# Patient Record
Sex: MALE | Race: WHITE | NOT HISPANIC OR LATINO | Employment: FULL TIME | URBAN - NONMETROPOLITAN AREA
[De-identification: names, ages, dates, MRNs, and addresses within clinical notes are randomized per-mention and may not be internally consistent; named-entity substitution may affect disease eponyms.]

---

## 2017-02-18 ENCOUNTER — HOSPITAL ENCOUNTER (EMERGENCY)
Facility: HOSPITAL | Age: 37
Discharge: HOME | End: 2017-02-18
Attending: EMERGENCY MEDICINE | Admitting: EMERGENCY MEDICINE

## 2017-02-18 ENCOUNTER — APPOINTMENT (OUTPATIENT)
Dept: CT IMAGING | Facility: HOSPITAL | Age: 37
End: 2017-02-18

## 2017-02-18 VITALS
HEART RATE: 110 BPM | OXYGEN SATURATION: 94 % | WEIGHT: 214 LBS | SYSTOLIC BLOOD PRESSURE: 139 MMHG | BODY MASS INDEX: 31.7 KG/M2 | TEMPERATURE: 98.9 F | RESPIRATION RATE: 18 BRPM | DIASTOLIC BLOOD PRESSURE: 98 MMHG | HEIGHT: 69 IN

## 2017-02-18 DIAGNOSIS — S01.91XA LACERATION OF HEAD WITHOUT FOREIGN BODY, UNSPECIFIED PART OF HEAD, INITIAL ENCOUNTER: Primary | ICD-10-CM

## 2017-02-18 PROCEDURE — 99282 EMERGENCY DEPT VISIT SF MDM: CPT

## 2017-02-18 RX ORDER — LIDOCAINE HYDROCHLORIDE AND EPINEPHRINE 10; 10 MG/ML; UG/ML
10 INJECTION, SOLUTION INFILTRATION; PERINEURAL ONCE
Status: DISCONTINUED | OUTPATIENT
Start: 2017-02-18 | End: 2017-02-18 | Stop reason: HOSPADM

## 2017-02-18 RX ORDER — BACITRACIN ZINC 500 [USP'U]/G
1 OINTMENT TOPICAL ONCE
Status: DISCONTINUED | OUTPATIENT
Start: 2017-02-18 | End: 2017-02-18 | Stop reason: HOSPADM

## 2017-02-18 RX ORDER — CEPHALEXIN 500 MG/1
1000 CAPSULE ORAL 2 TIMES DAILY
Qty: 20 CAPSULE | Refills: 0 | OUTPATIENT
Start: 2017-02-18 | End: 2020-03-27

## 2017-02-18 RX ORDER — IBUPROFEN 600 MG/1
600 TABLET ORAL EVERY 8 HOURS PRN
Qty: 21 TABLET | Refills: 0 | OUTPATIENT
Start: 2017-02-18 | End: 2020-03-27

## 2017-02-18 NOTE — ED PROVIDER NOTES
Subjective   HPI Comments: Patient running after someone on his outdoor fire escape.  Patient struck his head on a metal object.  No LOC.  Laceration and bleeding to the area.  Patient has been drinking.  Not feeling any pain at this time.      Patient is a 36 y.o. male presenting with head injury.   Head Injury   Location:  Frontal  Time since incident:  3 hours  Mechanism of injury: self-inflicted    Mechanism of injury comment:  Hit a balcony  Pain details:     Quality:  Aching    Severity:  Mild    Duration:  3 hours    Timing:  Constant    Progression:  Unchanged  Chronicity:  New  Relieved by:  Nothing  Worsened by:  Nothing  Ineffective treatments:  None tried  Associated symptoms: no blurred vision, no difficulty breathing, no disorientation, no double vision, no focal weakness, no headaches, no hearing loss, no loss of consciousness, no memory loss, no nausea, no neck pain, no numbness, no seizures, no tinnitus and no vomiting    Risk factors: alcohol intake    Risk factors: no obesity and no occupational exposure        Review of Systems   Constitutional: Negative.  Negative for appetite change, chills and fever.   HENT: Negative.  Negative for congestion, hearing loss and tinnitus.    Eyes: Negative.  Negative for blurred vision, double vision, photophobia and visual disturbance.   Respiratory: Negative.  Negative for cough, chest tightness and shortness of breath.    Cardiovascular: Negative.  Negative for chest pain and palpitations.   Gastrointestinal: Negative.  Negative for abdominal pain, constipation, diarrhea, nausea and vomiting.   Endocrine: Negative.    Genitourinary: Negative.  Negative for decreased urine volume, dysuria, flank pain and hematuria.   Musculoskeletal: Negative.  Negative for arthralgias, back pain, myalgias, neck pain and neck stiffness.   Skin: Negative.  Negative for pallor.   Neurological: Negative.  Negative for dizziness, focal weakness, seizures, loss of consciousness,  syncope, weakness, light-headedness, numbness and headaches.   Psychiatric/Behavioral: Negative.  Negative for confusion, memory loss and suicidal ideas. The patient is not nervous/anxious.        No past medical history on file.    No Known Allergies    No past surgical history on file.    No family history on file.    Social History     Social History   • Marital status: Single     Spouse name: N/A   • Number of children: N/A   • Years of education: N/A     Social History Main Topics   • Smoking status: Not on file   • Smokeless tobacco: Not on file   • Alcohol use Not on file   • Drug use: Not on file   • Sexual activity: Not on file     Other Topics Concern   • Not on file     Social History Narrative           Objective   Physical Exam   Constitutional: He is oriented to person, place, and time. He appears well-developed and well-nourished. No distress.   HENT:   Head: Normocephalic.       Eyes: Conjunctivae and EOM are normal.   Neck: Normal range of motion. Neck supple. No JVD present.   Cardiovascular: Normal rate, regular rhythm, normal heart sounds and intact distal pulses.  Exam reveals no gallop and no friction rub.    No murmur heard.  Pulmonary/Chest: Effort normal. No respiratory distress. He has no wheezes. He has no rales. He exhibits no tenderness.   Abdominal: Soft. Bowel sounds are normal. He exhibits no distension and no mass. There is no tenderness. There is no rebound and no guarding.   Musculoskeletal: Normal range of motion.   Neurological: He is alert and oriented to person, place, and time.   Skin: Skin is warm and dry.   Psychiatric: He has a normal mood and affect. His behavior is normal. Judgment and thought content normal.   Nursing note and vitals reviewed.      Laceration Repair  Date/Time: 2/18/2017 4:03 AM  Performed by: HSANE GONZALEZ  Authorized by: SHANE GONZALEZ   Consent: Verbal consent obtained. Written consent not obtained.  Risks and benefits: risks, benefits and  alternatives were discussed  Consent given by: patient  Patient understanding: patient states understanding of the procedure being performed  Body area: head/neck  Location details: forehead  Laceration length: 2 cm  Tendon involvement: none  Nerve involvement: none  Vascular damage: no  Anesthesia: local infiltration    Anesthesia:  Anesthesia: local infiltration  Local Anesthetic: lidocaine 1% with epinephrine   Anesthetic total: 3 mL  Sedation:  Patient sedated: no    Irrigation solution: saline  Irrigation method: syringe  Amount of cleaning: extensive  Debridement: none  Degree of undermining: none  Skin closure: 5-0 nylon  Number of sutures: 8  Technique: simple  Approximation: close  Approximation difficulty: simple  Dressing: antibiotic ointment  Patient tolerance: Patient tolerated the procedure well with no immediate complications               ED Course  ED Course      Labs Reviewed - No data to display    No orders to display       Patient refuses CT scan, though no n/v, no AMS, no dizziness or neurologic symptoms.            MDM    Final diagnoses:   Laceration of head without foreign body, unspecified part of head, initial encounter            Cirilo Robledo MD  02/18/17 [id number]

## 2020-03-27 ENCOUNTER — APPOINTMENT (OUTPATIENT)
Dept: GENERAL RADIOLOGY | Facility: HOSPITAL | Age: 40
End: 2020-03-27

## 2020-03-27 ENCOUNTER — HOSPITAL ENCOUNTER (EMERGENCY)
Facility: HOSPITAL | Age: 40
Discharge: HOME | End: 2020-03-27
Attending: EMERGENCY MEDICINE | Admitting: EMERGENCY MEDICINE

## 2020-03-27 VITALS
OXYGEN SATURATION: 97 % | SYSTOLIC BLOOD PRESSURE: 115 MMHG | WEIGHT: 217 LBS | TEMPERATURE: 98.1 F | RESPIRATION RATE: 16 BRPM | DIASTOLIC BLOOD PRESSURE: 80 MMHG | HEIGHT: 69 IN | HEART RATE: 76 BPM | BODY MASS INDEX: 32.14 KG/M2

## 2020-03-27 DIAGNOSIS — R22.1 THROAT SWELLING: Primary | ICD-10-CM

## 2020-03-27 LAB
ALBUMIN SERPL-MCNC: 4.3 G/DL (ref 3.5–5.2)
ALBUMIN/GLOB SERPL: 1.7 G/DL
ALP SERPL-CCNC: 71 U/L (ref 39–117)
ALT SERPL W P-5'-P-CCNC: 41 U/L (ref 1–41)
ANION GAP SERPL CALCULATED.3IONS-SCNC: 15 MMOL/L (ref 5–15)
AST SERPL-CCNC: 20 U/L (ref 1–40)
BASOPHILS # BLD AUTO: 0.02 10*3/MM3 (ref 0–0.2)
BASOPHILS NFR BLD AUTO: 0.3 % (ref 0–1.5)
BILIRUB SERPL-MCNC: 0.2 MG/DL (ref 0.2–1.2)
BUN BLD-MCNC: 18 MG/DL (ref 6–20)
BUN/CREAT SERPL: 15.9 (ref 7–25)
CALCIUM SPEC-SCNC: 9.8 MG/DL (ref 8.6–10.5)
CHLORIDE SERPL-SCNC: 100 MMOL/L (ref 98–107)
CO2 SERPL-SCNC: 21 MMOL/L (ref 22–29)
CREAT BLD-MCNC: 1.13 MG/DL (ref 0.76–1.27)
DEPRECATED RDW RBC AUTO: 42.5 FL (ref 37–54)
EOSINOPHIL # BLD AUTO: 0.15 10*3/MM3 (ref 0–0.4)
EOSINOPHIL NFR BLD AUTO: 2.1 % (ref 0.3–6.2)
ERYTHROCYTE [DISTWIDTH] IN BLOOD BY AUTOMATED COUNT: 13.2 % (ref 12.3–15.4)
GFR SERPL CREATININE-BSD FRML MDRD: 73 ML/MIN/1.73
GLOBULIN UR ELPH-MCNC: 2.6 GM/DL
GLUCOSE BLD-MCNC: 130 MG/DL (ref 65–99)
HCT VFR BLD AUTO: 39 % (ref 37.5–51)
HGB BLD-MCNC: 13.4 G/DL (ref 13–17.7)
IMM GRANULOCYTES # BLD AUTO: 0.06 10*3/MM3 (ref 0–0.05)
IMM GRANULOCYTES NFR BLD AUTO: 0.8 % (ref 0–0.5)
LYMPHOCYTES # BLD AUTO: 2.22 10*3/MM3 (ref 0.7–3.1)
LYMPHOCYTES NFR BLD AUTO: 30.4 % (ref 19.6–45.3)
MAGNESIUM SERPL-MCNC: 2 MG/DL (ref 1.6–2.6)
MCH RBC QN AUTO: 30.2 PG (ref 26.6–33)
MCHC RBC AUTO-ENTMCNC: 34.4 G/DL (ref 31.5–35.7)
MCV RBC AUTO: 88 FL (ref 79–97)
MONOCYTES # BLD AUTO: 0.61 10*3/MM3 (ref 0.1–0.9)
MONOCYTES NFR BLD AUTO: 8.3 % (ref 5–12)
NEUTROPHILS # BLD AUTO: 4.25 10*3/MM3 (ref 1.7–7)
NEUTROPHILS NFR BLD AUTO: 58.1 % (ref 42.7–76)
NRBC BLD AUTO-RTO: 0 /100 WBC (ref 0–0.2)
NT-PROBNP SERPL-MCNC: 53.5 PG/ML (ref 5–450)
PLATELET # BLD AUTO: 228 10*3/MM3 (ref 140–450)
PMV BLD AUTO: 8.8 FL (ref 6–12)
POTASSIUM BLD-SCNC: 3.8 MMOL/L (ref 3.5–5.2)
PROT SERPL-MCNC: 6.9 G/DL (ref 6–8.5)
RBC # BLD AUTO: 4.43 10*6/MM3 (ref 4.14–5.8)
SODIUM BLD-SCNC: 136 MMOL/L (ref 136–145)
TROPONIN T SERPL-MCNC: <0.01 NG/ML (ref 0–0.03)
WBC NRBC COR # BLD: 7.31 10*3/MM3 (ref 3.4–10.8)

## 2020-03-27 PROCEDURE — 85025 COMPLETE CBC W/AUTO DIFF WBC: CPT | Performed by: EMERGENCY MEDICINE

## 2020-03-27 PROCEDURE — 25010000002 METHYLPREDNISOLONE PER 125 MG: Performed by: EMERGENCY MEDICINE

## 2020-03-27 PROCEDURE — 71045 X-RAY EXAM CHEST 1 VIEW: CPT

## 2020-03-27 PROCEDURE — 80053 COMPREHEN METABOLIC PANEL: CPT | Performed by: EMERGENCY MEDICINE

## 2020-03-27 PROCEDURE — 83880 ASSAY OF NATRIURETIC PEPTIDE: CPT | Performed by: EMERGENCY MEDICINE

## 2020-03-27 PROCEDURE — 96375 TX/PRO/DX INJ NEW DRUG ADDON: CPT

## 2020-03-27 PROCEDURE — 84484 ASSAY OF TROPONIN QUANT: CPT | Performed by: EMERGENCY MEDICINE

## 2020-03-27 PROCEDURE — 93005 ELECTROCARDIOGRAM TRACING: CPT | Performed by: EMERGENCY MEDICINE

## 2020-03-27 PROCEDURE — 93010 ELECTROCARDIOGRAM REPORT: CPT | Performed by: INTERNAL MEDICINE

## 2020-03-27 PROCEDURE — 83735 ASSAY OF MAGNESIUM: CPT | Performed by: EMERGENCY MEDICINE

## 2020-03-27 PROCEDURE — 36415 COLL VENOUS BLD VENIPUNCTURE: CPT

## 2020-03-27 PROCEDURE — 96374 THER/PROPH/DIAG INJ IV PUSH: CPT

## 2020-03-27 PROCEDURE — 99283 EMERGENCY DEPT VISIT LOW MDM: CPT

## 2020-03-27 PROCEDURE — 93005 ELECTROCARDIOGRAM TRACING: CPT

## 2020-03-27 RX ORDER — SODIUM CHLORIDE 0.9 % (FLUSH) 0.9 %
10 SYRINGE (ML) INJECTION AS NEEDED
Status: DISCONTINUED | OUTPATIENT
Start: 2020-03-27 | End: 2020-03-27 | Stop reason: HOSPADM

## 2020-03-27 RX ORDER — LOVASTATIN 40 MG/1
40 TABLET ORAL NIGHTLY
COMMUNITY
End: 2020-12-03 | Stop reason: SDUPTHER

## 2020-03-27 RX ORDER — AMLODIPINE BESYLATE 10 MG/1
10 TABLET ORAL DAILY
Qty: 15 TABLET | Refills: 0 | Status: SHIPPED | OUTPATIENT
Start: 2020-03-27 | End: 2020-04-11

## 2020-03-27 RX ORDER — LISINOPRIL AND HYDROCHLOROTHIAZIDE 20; 12.5 MG/1; MG/1
1 TABLET ORAL DAILY
COMMUNITY
End: 2020-03-27

## 2020-03-27 RX ORDER — FAMOTIDINE 20 MG/1
20 TABLET, FILM COATED ORAL NIGHTLY
Qty: 4 TABLET | Refills: 0 | Status: SHIPPED | OUTPATIENT
Start: 2020-03-27 | End: 2020-03-31

## 2020-03-27 RX ORDER — PREDNISONE 20 MG/1
20 TABLET ORAL DAILY
Qty: 4 TABLET | Refills: 0 | Status: SHIPPED | OUTPATIENT
Start: 2020-03-27 | End: 2020-03-31

## 2020-03-27 RX ORDER — FAMOTIDINE 10 MG/ML
20 INJECTION, SOLUTION INTRAVENOUS ONCE
Status: COMPLETED | OUTPATIENT
Start: 2020-03-27 | End: 2020-03-27

## 2020-03-27 RX ORDER — METHYLPREDNISOLONE SODIUM SUCCINATE 125 MG/2ML
125 INJECTION, POWDER, LYOPHILIZED, FOR SOLUTION INTRAMUSCULAR; INTRAVENOUS ONCE
Status: COMPLETED | OUTPATIENT
Start: 2020-03-27 | End: 2020-03-27

## 2020-03-27 RX ADMIN — FAMOTIDINE 20 MG: 10 INJECTION INTRAVENOUS at 03:22

## 2020-03-27 RX ADMIN — METHYLPREDNISOLONE SODIUM SUCCINATE 125 MG: 125 INJECTION, POWDER, FOR SOLUTION INTRAMUSCULAR; INTRAVENOUS at 03:22

## 2020-12-03 ENCOUNTER — OFFICE VISIT (OUTPATIENT)
Dept: OTOLARYNGOLOGY | Facility: CLINIC | Age: 40
End: 2020-12-03

## 2020-12-03 VITALS — HEIGHT: 69 IN | OXYGEN SATURATION: 98 % | WEIGHT: 224.4 LBS | BODY MASS INDEX: 33.24 KG/M2

## 2020-12-03 DIAGNOSIS — H93.13 TINNITUS OF BOTH EARS: ICD-10-CM

## 2020-12-03 DIAGNOSIS — J32.3 CHRONIC SPHENOIDAL SINUSITIS: Primary | ICD-10-CM

## 2020-12-03 DIAGNOSIS — H90.3 SENSORINEURAL HEARING LOSS OF BOTH EARS: ICD-10-CM

## 2020-12-03 DIAGNOSIS — J34.2 NASAL SEPTAL DEFORMITY: ICD-10-CM

## 2020-12-03 PROCEDURE — 31231 NASAL ENDOSCOPY DX: CPT | Performed by: OTOLARYNGOLOGY

## 2020-12-03 PROCEDURE — 99203 OFFICE O/P NEW LOW 30 MIN: CPT | Performed by: OTOLARYNGOLOGY

## 2020-12-03 RX ORDER — HYDROCHLOROTHIAZIDE 12.5 MG/1
12.5 TABLET ORAL 2 TIMES DAILY
COMMUNITY
Start: 2020-12-01

## 2020-12-03 RX ORDER — FLUTICASONE PROPIONATE 50 MCG
2 SPRAY, SUSPENSION (ML) NASAL DAILY
Qty: 16 G | Refills: 11 | Status: SHIPPED | OUTPATIENT
Start: 2020-12-03 | End: 2021-03-15 | Stop reason: HOSPADM

## 2020-12-03 RX ORDER — LOSARTAN POTASSIUM 25 MG/1
25 TABLET ORAL DAILY
COMMUNITY
Start: 2020-12-01

## 2020-12-03 RX ORDER — PREDNISONE 20 MG/1
TABLET ORAL
Qty: 12 TABLET | Refills: 0 | Status: SHIPPED | OUTPATIENT
Start: 2020-12-03 | End: 2021-01-05

## 2020-12-03 RX ORDER — LOVASTATIN 40 MG/1
40 TABLET ORAL NIGHTLY
COMMUNITY
Start: 2020-12-01

## 2020-12-03 RX ORDER — CEFUROXIME AXETIL 500 MG/1
500 TABLET ORAL 2 TIMES DAILY
Qty: 42 TABLET | Refills: 0 | Status: SHIPPED | OUTPATIENT
Start: 2020-12-03 | End: 2021-01-05

## 2020-12-06 NOTE — PROGRESS NOTES
Subjective   Colby Don is a 40 y.o. male.     History of Present Illness     Patient reports he had a sinus infection back in September.  States that symptoms were mostly on the left side.  Experience facial pressure congestion and some mild rhinorrhea.  Was treated with antibiotics and steroids and had some improvement but symptoms then recurred in about a week and a half.  Was treated with additional antibiotics and again improved but states that symptoms recur about a week and 1/2 to 2 weeks after going off antibiotics and reports that just yesterday he has begun having left-sided nasal congestion and pressure as well as mild rhinorrhea and postnasal drainage.  Is not currently using any medication for this.  Additionally reports ringing in his ears that is been present for some time.  His hearing is chronically decreased on the left greater than the right and has been for years following head injury many years ago.  No previous otologic surgery.  He brings with him a copy of an audiogram obtained at [facility] dated 10/7/2020 is personally reviewed and shows a bilateral high-frequency sensorineural hearing loss that is worse on the left than the right.  Discrimination scores were 100% bilaterally.  Tympanograms were type a bilaterally.    The following portions of the patient's history were reviewed and updated as appropriate: allergies, current medications, past family history, past medical history, past social history, past surgical history and problem list.      Colby Don reports that he has quit smoking. He has never used smokeless tobacco.  Patient is not a tobacco user and has not been counseled for use of tobacco products    No family history on file.   Negative for hearing loss at an early age    No Known Allergies      Current Outpatient Medications:   •  ERGOCALCIFEROL PO, Take 50,000 Units by mouth., Disp: , Rfl:   •  lovastatin (MEVACOR) 40 MG tablet, TAKE ONE  TABLET BY MOUTH EVERY EVENING, Disp: , Rfl:   •  cefuroxime (CEFTIN) 500 MG tablet, Take 1 tablet by mouth 2 (Two) Times a Day., Disp: 42 tablet, Rfl: 0  •  fluticasone (FLONASE) 50 MCG/ACT nasal spray, 2 sprays into the nostril(s) as directed by provider Daily., Disp: 16 g, Rfl: 11  •  hydroCHLOROthiazide (HYDRODIURIL) 12.5 MG tablet, , Disp: , Rfl:   •  losartan (COZAAR) 25 MG tablet, , Disp: , Rfl:   •  predniSONE (DELTASONE) 20 MG tablet, 1 pill by mouth 3 times a day for 2 days then 1 pill by mouth twice a day for 2 days then 1 pill by mouth daily for 2 days, Disp: 12 tablet, Rfl: 0    Past Medical History:   Diagnosis Date   • Hyperlipidemia    • Hypertension        Past Surgical History:   Procedure Laterality Date   • WISDOM TOOTH EXTRACTION           Review of Systems   Constitutional: Negative for fever.   HENT: Positive for congestion and ear pain.    All other systems reviewed and are negative.          Objective   Physical Exam  General: Well-developed well-nourished male in no acute distress.  Alert and oriented x3. Head: Normocephalic. Face: Symmetrical strength and appearance. PERRL. EOMI. Voice:Strong. Speech:Fluent  Ears: External ears no deformity, canals no discharge, tympanic membranes intact clear and mobile bilaterally.  Nose: Nares show no discharge mass polyp or purulence.  Boggy mucosa is present.  No gross external deformity.  Septum: To the right inferiorly  Oral cavity: Lips and gums without lesions.  Tongue and floor of mouth without lesions.  Parotid and submandibular ducts unobstructed.  No mucosal lesions on the buccal mucosa or vestibule of the mouth.  Pharynx: No erythema exudate mass or ulcer  Neck: No lymphadenopathy.  No thyromegaly.  Trachea and larynx midline.  No masses in the parotid or submandibular glands.  Nasal endoscopy is performed: Fish-Synephrine and Xylocaine are instilled the nares bilaterally.  0° scope is passed into each nostril.  The inferior, middle, and  superior turbinates as well as nasal septum and nasopharynx are examined.  Pertinent findings include: Angulated septal deformity to the right inferiorly impacting and causing scalloping of the right inferior turbinate.  No evidence of mass or polyp in either middle meatal cleft but there is purulent material and what appears to be polyps in the left superior meatal cleft and face of the sphenoid.        Assessment/Plan   Diagnoses and all orders for this visit:    1. Chronic sphenoidal sinusitis (Primary)    2. Nasal septal deformity    3. Tinnitus of both ears    4. Sensorineural hearing loss of both ears    Other orders  -     fluticasone (FLONASE) 50 MCG/ACT nasal spray; 2 sprays into the nostril(s) as directed by provider Daily.  Dispense: 16 g; Refill: 11  -     cefuroxime (CEFTIN) 500 MG tablet; Take 1 tablet by mouth 2 (Two) Times a Day.  Dispense: 42 tablet; Refill: 0  -     predniSONE (DELTASONE) 20 MG tablet; 1 pill by mouth 3 times a day for 2 days then 1 pill by mouth twice a day for 2 days then 1 pill by mouth daily for 2 days  Dispense: 12 tablet; Refill: 0        Plan: Aggressive medical therapy with 3 weeks of Ceftin 500 mg p.o. twice daily, 9-day prednisone taper, and institute Flonase 2 sprays each nostril once a day.  Reevaluate in 4 weeks.  If no better will obtain a CT scan at that time.    Also explained the nature of tinnitus to the patient and its relationship to hearing loss.  Advise use of masking noise as well as ear protection when unavoidably around loud noise going forward.  He should have another hearing test in a year.

## 2021-01-05 ENCOUNTER — OFFICE VISIT (OUTPATIENT)
Dept: OTOLARYNGOLOGY | Facility: CLINIC | Age: 41
End: 2021-01-05

## 2021-01-05 VITALS — HEIGHT: 69 IN | WEIGHT: 224 LBS | BODY MASS INDEX: 33.18 KG/M2 | OXYGEN SATURATION: 96 %

## 2021-01-05 DIAGNOSIS — J32.3 CHRONIC SPHENOIDAL SINUSITIS: Primary | ICD-10-CM

## 2021-01-05 DIAGNOSIS — J34.2 NASAL SEPTAL DEFORMITY: ICD-10-CM

## 2021-01-05 PROCEDURE — 99213 OFFICE O/P EST LOW 20 MIN: CPT | Performed by: OTOLARYNGOLOGY

## 2021-01-05 NOTE — PROGRESS NOTES
"Subjective   Colby Don is a 40 y.o. male.       History of Present Illness   Patient was seen previously with complaints of left-sided nasal congestion and intermittent rhinorrhea.  Was noted to have endoscopic findings consistent with left-sided sphenoid sinusitis.  Has completed a 3-week course of cefuroxime as well as a 6-day prednisone taper.  He continues to use Flonase.  He reports he is \"a lot better\".  Not having any significant facial pressure nor any rhinorrhea.  Still feels some occasional fullness in his ear.      The following portions of the patient's history were reviewed and updated as appropriate: allergies, current medications, past family history, past medical history, past social history, past surgical history and problem list.     reports that he has quit smoking. He has never used smokeless tobacco.   Patient is not a tobacco user and has not been counseled for use of tobacco products      Review of Systems        Objective   Physical Exam  Ears: External ears no deformity, canals no discharge, tympanic membranes intact clear and mobile bilaterally.  Nose: Boggy mucosa with no mass or polyp or purulence seen on anterior rhinoscopy.  Septum to the right  Oral cavity: Lips and gums without lesions.  Tongue and floor of mouth without lesions.  Parotid and submandibular ducts unobstructed.  No mucosal lesions on the buccal mucosa or vestibule of the mouth.  Pharynx: No erythema or exudate      Assessment/Plan   Diagnoses and all orders for this visit:    1. Chronic sphenoidal sinusitis (Primary)    2. Nasal septal deformity      Plan: Since he is clinically improved I have just recommended continuing his Flonase daily and return in 6 months but call for recurrent symptoms prior to that.      "

## 2021-02-08 ENCOUNTER — OFFICE VISIT (OUTPATIENT)
Dept: OTOLARYNGOLOGY | Facility: CLINIC | Age: 41
End: 2021-02-08

## 2021-02-08 VITALS — HEIGHT: 69 IN | WEIGHT: 224.4 LBS | TEMPERATURE: 98.5 F | BODY MASS INDEX: 33.24 KG/M2 | OXYGEN SATURATION: 97 %

## 2021-02-08 DIAGNOSIS — J32.3 CHRONIC SPHENOIDAL SINUSITIS: Primary | ICD-10-CM

## 2021-02-08 PROCEDURE — 99214 OFFICE O/P EST MOD 30 MIN: CPT | Performed by: OTOLARYNGOLOGY

## 2021-02-08 RX ORDER — AMOXICILLIN AND CLAVULANATE POTASSIUM 875; 125 MG/1; MG/1
1 TABLET, FILM COATED ORAL EVERY 12 HOURS
Qty: 42 TABLET | Refills: 0 | Status: SHIPPED | OUTPATIENT
Start: 2021-02-08 | End: 2021-03-10

## 2021-02-08 NOTE — PROGRESS NOTES
Subjective   Colby Don is a 40 y.o. male.       History of Present Illness   Patient was seen previously with left-sided nasal congestion, rhinorrhea, and ear fullness and headache.  Nasal endoscopy had revealed what appeared to be sphenoid sinusitis.  Was given a 3-week course of cefuroxime and improved significantly.  Reports that within a week of having seen me on 1/5/2021 he started having recurrent symptoms and is now back to about as bad as he was before he saw me.  Left nose is congested he is having postnasal drainage and rhinorrhea as well as ear fullness and headache      The following portions of the patient's history were reviewed and updated as appropriate: allergies, current medications, past family history, past medical history, past social history, past surgical history and problem list.     reports that he has quit smoking. He has never used smokeless tobacco.   Patient is not a tobacco user and has not been counseled for use of tobacco products      Review of Systems        Objective   Physical Exam  Ears: External ears no deformity, canals no discharge, tympanic membranes intact clear and mobile bilaterally.  Nose: Septum to the right.  Boggy mucosa.  No purulence noted anteriorly        Assessment/Plan   Diagnoses and all orders for this visit:    1. Chronic sphenoidal sinusitis (Primary)  -     CT Maxillofacial Without Contrast; Future    Other orders  -     amoxicillin-clavulanate (AUGMENTIN) 875-125 MG per tablet; Take 1 tablet by mouth Every 12 (Twelve) Hours.  Dispense: 42 tablet; Refill: 0      Plan: Clinically this is a rapid recurrence of his previous sphenoid sinusitis.  I do not think he needs nasal endoscopy to confirm this.  We will treat with Augmentin 875 mg twice daily and schedule CT scan of the sinuses.  Patient will be seen back after his scan.

## 2021-02-23 ENCOUNTER — HOSPITAL ENCOUNTER (OUTPATIENT)
Dept: CT IMAGING | Facility: HOSPITAL | Age: 41
Discharge: HOME | End: 2021-02-23
Admitting: OTOLARYNGOLOGY

## 2021-02-23 ENCOUNTER — OFFICE VISIT (OUTPATIENT)
Dept: OTOLARYNGOLOGY | Facility: CLINIC | Age: 41
End: 2021-02-23

## 2021-02-23 VITALS — WEIGHT: 219.2 LBS | OXYGEN SATURATION: 98 % | BODY MASS INDEX: 32.47 KG/M2 | HEIGHT: 69 IN

## 2021-02-23 DIAGNOSIS — J34.2 NASAL SEPTAL DEFORMITY: ICD-10-CM

## 2021-02-23 DIAGNOSIS — J32.3 CHRONIC SPHENOIDAL SINUSITIS: ICD-10-CM

## 2021-02-23 DIAGNOSIS — J32.4 CHRONIC PANSINUSITIS: Primary | ICD-10-CM

## 2021-02-23 PROCEDURE — 99214 OFFICE O/P EST MOD 30 MIN: CPT | Performed by: OTOLARYNGOLOGY

## 2021-02-23 PROCEDURE — 70486 CT MAXILLOFACIAL W/O DYE: CPT

## 2021-02-23 RX ORDER — BUSPIRONE HYDROCHLORIDE 5 MG/1
5 TABLET ORAL
COMMUNITY
Start: 2021-01-12 | End: 2021-03-10

## 2021-02-23 RX ORDER — OXYMETAZOLINE HYDROCHLORIDE 0.05 G/100ML
2 SPRAY NASAL
Status: CANCELLED | OUTPATIENT
Start: 2021-03-15 | End: 2021-03-18

## 2021-02-23 NOTE — PROGRESS NOTES
Subjective   Colby Don is a 40 y.o. male.     History of Present Illness     Patient has been followed with a longstanding history of recurring sinus infections purulent rhinorrhea and nasal congestion headaches and aural fullness.  Clinically had evidence of sinusitis.  Initially had good response to medical therapy but then had recurrence of symptoms.  Has been taking Augmentin and reports that he has still had congestion, rhinorrhea, and postnasal drainage as well as mid facial pressure and headache.  Had a CT scan of the sinuses this morning that is personally reviewed.  This shows complete opacification of the left sphenoid and left posterior ethmoid along with chronic disease of the anterior ethmoids, maxillary sinuses, and frontal sinuses evidenced by mucosal thickening.  He also has a marked nasal septal deformity.    The following portions of the patient's history were reviewed and updated as appropriate: allergies, current medications, past family history, past medical history, past social history, past surgical history and problem list.      Colby Don reports that he has quit smoking. He has never used smokeless tobacco.  Patient is not a tobacco user and has not been counseled for use of tobacco products    No family history on file.   Negative for bleeding disorder    No Known Allergies      Current Outpatient Medications:   •  amoxicillin-clavulanate (AUGMENTIN) 875-125 MG per tablet, Take 1 tablet by mouth Every 12 (Twelve) Hours., Disp: 42 tablet, Rfl: 0  •  busPIRone (BUSPAR) 5 MG tablet, Take 5 mg by mouth., Disp: , Rfl:   •  ERGOCALCIFEROL PO, Take 50,000 Units by mouth., Disp: , Rfl:   •  fluticasone (FLONASE) 50 MCG/ACT nasal spray, 2 sprays into the nostril(s) as directed by provider Daily., Disp: 16 g, Rfl: 11  •  hydroCHLOROthiazide (HYDRODIURIL) 12.5 MG tablet, , Disp: , Rfl:   •  losartan (COZAAR) 25 MG tablet, , Disp: , Rfl:   •  lovastatin (MEVACOR) 40 MG tablet,  TAKE ONE TABLET BY MOUTH EVERY EVENING, Disp: , Rfl:     Past Medical History:   Diagnosis Date   • Hyperlipidemia    • Hypertension        Past Surgical History:   Procedure Laterality Date   • WISDOM TOOTH EXTRACTION           Review of Systems   Constitutional: Negative for fever.   HENT: Positive for congestion.            Objective   Physical Exam  General: Well-developed well-nourished male in no acute distress.  Alert and oriented x3. Voice:Strong. Speech:Fluent  Ears: External ears no deformity, canals no discharge, tympanic membranes intact clear and mobile bilaterally.  Nose: Nares show boggy mucosa.  No mass or purulence visible on anterior rhinoscopy.  Septal deformity to the right  Oral cavity: Lips and gums without lesions.  Tongue and floor of mouth without lesions.  Parotid and submandibular ducts unobstructed.  No mucosal lesions on the buccal mucosa or vestibule of the mouth.  Pharynx: No erythema exudate mass or ulcer  Neck: No lymphadenopathy.  No thyromegaly.  Trachea and larynx midline.  No masses in the parotid or submandibular glands.  Chest: Clear.  Heart: Regular.  Abdomen: Benign.        Assessment/Plan   Diagnoses and all orders for this visit:    1. Chronic pansinusitis (Primary)  -     Case Request; Standing  -     oxymetazoline (AFRIN) nasal spray 2 spray  -     Case Request    2. Nasal septal deformity  -     Case Request; Standing  -     oxymetazoline (AFRIN) nasal spray 2 spray  -     Case Request    Other orders  -     Follow Anesthesia Guidelines / Standing Orders; Future  -     Obtain Informed Consent; Future  -     Follow Anesthesia Guidelines / Standing Orders; Standing  -     Obtain Informed Consent; Standing  -     SCD (Sequential Compression Device) - To Be Placed on Patient in Pre-Op; Standing  -     NPO Diet; Standing      Plan: Given his persistent symptoms despite multiple rounds of appropriate antibiotics as well as significantly abnormal radiographic studies I think  surgery is a reasonable option.  I have offered to perform bilateral endoscopic complete ethmoidectomy, maxillary sinus antrostomy, frontal recess exploration, sphenoidotomy along with nasal septoplasty.  I explained the nature of these procedures to the patient in layman's terms including need for general anesthetic and risks including bleeding, infection, poor healing, failure to improve symptoms, potential for damage to the eyes or base of the brain which could result in leakage of spinal fluid and potentially require additional surgery, possibility of hole in the nasal septum or numbness of the front teeth and palate, as well as the need for postop debridements which may be quite painful.  Proposed benefit would be improved nasal airflow, decreased frequency of sinus infections and sinus related symptoms including congestion and rhinorrhea.  The alternative would be observation and continued medical management which was discussed and offered.  Patient voices understanding of all the above and wished to proceed with surgery.  This is scheduled.

## 2021-03-10 RX ORDER — CHLORAL HYDRATE 500 MG
1000 CAPSULE ORAL
COMMUNITY

## 2021-03-12 ENCOUNTER — LAB (OUTPATIENT)
Dept: LAB | Facility: HOSPITAL | Age: 41
End: 2021-03-12

## 2021-03-12 DIAGNOSIS — Z01.818 PREOP TESTING: Primary | ICD-10-CM

## 2021-03-12 PROCEDURE — C9803 HOPD COVID-19 SPEC COLLECT: HCPCS

## 2021-03-12 PROCEDURE — U0004 COV-19 TEST NON-CDC HGH THRU: HCPCS

## 2021-03-13 LAB — SARS-COV-2 ORF1AB RESP QL NAA+PROBE: NOT DETECTED

## 2021-03-14 ENCOUNTER — ANESTHESIA EVENT (OUTPATIENT)
Dept: PERIOP | Facility: HOSPITAL | Age: 41
End: 2021-03-14

## 2021-03-15 ENCOUNTER — HOSPITAL ENCOUNTER (OUTPATIENT)
Facility: HOSPITAL | Age: 41
Setting detail: HOSPITAL OUTPATIENT SURGERY
Discharge: HOME | End: 2021-03-15
Attending: OTOLARYNGOLOGY | Admitting: OTOLARYNGOLOGY

## 2021-03-15 ENCOUNTER — ANESTHESIA (OUTPATIENT)
Dept: PERIOP | Facility: HOSPITAL | Age: 41
End: 2021-03-15

## 2021-03-15 VITALS
RESPIRATION RATE: 20 BRPM | BODY MASS INDEX: 32.1 KG/M2 | SYSTOLIC BLOOD PRESSURE: 156 MMHG | WEIGHT: 216.71 LBS | HEART RATE: 80 BPM | DIASTOLIC BLOOD PRESSURE: 100 MMHG | OXYGEN SATURATION: 96 % | TEMPERATURE: 97 F | HEIGHT: 69 IN

## 2021-03-15 DIAGNOSIS — J32.4 CHRONIC PANSINUSITIS: ICD-10-CM

## 2021-03-15 DIAGNOSIS — J34.2 NASAL SEPTAL DEFORMITY: ICD-10-CM

## 2021-03-15 LAB
ANION GAP SERPL CALCULATED.3IONS-SCNC: 12 MMOL/L (ref 5–15)
BUN SERPL-MCNC: 18 MG/DL (ref 6–20)
BUN/CREAT SERPL: 20.2 (ref 7–25)
CALCIUM SPEC-SCNC: 9.1 MG/DL (ref 8.6–10.5)
CHLORIDE SERPL-SCNC: 101 MMOL/L (ref 98–107)
CO2 SERPL-SCNC: 26 MMOL/L (ref 22–29)
CREAT SERPL-MCNC: 0.89 MG/DL (ref 0.76–1.27)
GFR SERPL CREATININE-BSD FRML MDRD: 95 ML/MIN/1.73
GLUCOSE SERPL-MCNC: 124 MG/DL (ref 65–99)
POTASSIUM SERPL-SCNC: 3.7 MMOL/L (ref 3.5–5.2)
QT INTERVAL: 370 MS
QTC INTERVAL: 457 MS
SODIUM SERPL-SCNC: 139 MMOL/L (ref 136–145)

## 2021-03-15 PROCEDURE — 25010000002 HYDROMORPHONE PER 4 MG: Performed by: NURSE ANESTHETIST, CERTIFIED REGISTERED

## 2021-03-15 PROCEDURE — 25010000002 MIDAZOLAM PER 1 MG: Performed by: NURSE ANESTHETIST, CERTIFIED REGISTERED

## 2021-03-15 PROCEDURE — 80048 BASIC METABOLIC PNL TOTAL CA: CPT | Performed by: ANESTHESIOLOGY

## 2021-03-15 PROCEDURE — 25010000002 ONDANSETRON PER 1 MG: Performed by: NURSE ANESTHETIST, CERTIFIED REGISTERED

## 2021-03-15 PROCEDURE — 25010000002 NEOSTIGMINE 4 MG/4ML SOLUTION PREFILLED SYRINGE: Performed by: NURSE ANESTHETIST, CERTIFIED REGISTERED

## 2021-03-15 PROCEDURE — 31257 NSL/SINS NDSC TOT W/SPHENDT: CPT | Performed by: OTOLARYNGOLOGY

## 2021-03-15 PROCEDURE — 25010000002 PROPOFOL 10 MG/ML EMULSION: Performed by: NURSE ANESTHETIST, CERTIFIED REGISTERED

## 2021-03-15 PROCEDURE — 93010 ELECTROCARDIOGRAM REPORT: CPT | Performed by: INTERNAL MEDICINE

## 2021-03-15 PROCEDURE — 30520 REPAIR OF NASAL SEPTUM: CPT | Performed by: OTOLARYNGOLOGY

## 2021-03-15 PROCEDURE — 25010000002 FENTANYL CITRATE (PF) 100 MCG/2ML SOLUTION: Performed by: NURSE ANESTHETIST, CERTIFIED REGISTERED

## 2021-03-15 PROCEDURE — 31256 EXPLORATION MAXILLARY SINUS: CPT | Performed by: OTOLARYNGOLOGY

## 2021-03-15 PROCEDURE — 31276 NSL/SINS NDSC FRNT TISS RMVL: CPT | Performed by: OTOLARYNGOLOGY

## 2021-03-15 PROCEDURE — 25010000002 HYDROMORPHONE 1 MG/ML SOLUTION: Performed by: NURSE ANESTHETIST, CERTIFIED REGISTERED

## 2021-03-15 PROCEDURE — 25010000002 DEXAMETHASONE PER 1 MG: Performed by: NURSE ANESTHETIST, CERTIFIED REGISTERED

## 2021-03-15 PROCEDURE — 93005 ELECTROCARDIOGRAM TRACING: CPT | Performed by: ANESTHESIOLOGY

## 2021-03-15 RX ORDER — FENTANYL CITRATE 50 UG/ML
INJECTION, SOLUTION INTRAMUSCULAR; INTRAVENOUS AS NEEDED
Status: DISCONTINUED | OUTPATIENT
Start: 2021-03-15 | End: 2021-03-15 | Stop reason: SURG

## 2021-03-15 RX ORDER — LIDOCAINE HYDROCHLORIDE AND EPINEPHRINE 10; 10 MG/ML; UG/ML
INJECTION, SOLUTION INFILTRATION; PERINEURAL AS NEEDED
Status: DISCONTINUED | OUTPATIENT
Start: 2021-03-15 | End: 2021-03-15 | Stop reason: HOSPADM

## 2021-03-15 RX ORDER — MIDAZOLAM HYDROCHLORIDE 1 MG/ML
INJECTION INTRAMUSCULAR; INTRAVENOUS AS NEEDED
Status: DISCONTINUED | OUTPATIENT
Start: 2021-03-15 | End: 2021-03-15 | Stop reason: SURG

## 2021-03-15 RX ORDER — OXYMETAZOLINE HYDROCHLORIDE 0.05 G/100ML
2 SPRAY NASAL
Status: DISCONTINUED | OUTPATIENT
Start: 2021-03-15 | End: 2021-03-15 | Stop reason: HOSPADM

## 2021-03-15 RX ORDER — PROPOFOL 10 MG/ML
VIAL (ML) INTRAVENOUS AS NEEDED
Status: DISCONTINUED | OUTPATIENT
Start: 2021-03-15 | End: 2021-03-15 | Stop reason: SURG

## 2021-03-15 RX ORDER — LIDOCAINE HYDROCHLORIDE AND EPINEPHRINE BITARTRATE 20; .01 MG/ML; MG/ML
INJECTION, SOLUTION SUBCUTANEOUS AS NEEDED
Status: DISCONTINUED | OUTPATIENT
Start: 2021-03-15 | End: 2021-03-15 | Stop reason: HOSPADM

## 2021-03-15 RX ORDER — MEPERIDINE HYDROCHLORIDE 25 MG/ML
12.5 INJECTION INTRAMUSCULAR; INTRAVENOUS; SUBCUTANEOUS
Status: DISCONTINUED | OUTPATIENT
Start: 2021-03-15 | End: 2021-03-15 | Stop reason: HOSPADM

## 2021-03-15 RX ORDER — ROCURONIUM BROMIDE 10 MG/ML
INJECTION, SOLUTION INTRAVENOUS AS NEEDED
Status: DISCONTINUED | OUTPATIENT
Start: 2021-03-15 | End: 2021-03-15 | Stop reason: SURG

## 2021-03-15 RX ORDER — SODIUM CHLORIDE, SODIUM GLUCONATE, SODIUM ACETATE, POTASSIUM CHLORIDE, AND MAGNESIUM CHLORIDE 526; 502; 368; 37; 30 MG/100ML; MG/100ML; MG/100ML; MG/100ML; MG/100ML
1000 INJECTION, SOLUTION INTRAVENOUS CONTINUOUS
Status: DISCONTINUED | OUTPATIENT
Start: 2021-03-15 | End: 2021-03-15 | Stop reason: HOSPADM

## 2021-03-15 RX ORDER — NEOSTIGMINE METHYLSULFATE 4 MG/4 ML
SYRINGE (ML) INTRAVENOUS AS NEEDED
Status: DISCONTINUED | OUTPATIENT
Start: 2021-03-15 | End: 2021-03-15 | Stop reason: SURG

## 2021-03-15 RX ORDER — ONDANSETRON 2 MG/ML
INJECTION INTRAMUSCULAR; INTRAVENOUS AS NEEDED
Status: DISCONTINUED | OUTPATIENT
Start: 2021-03-15 | End: 2021-03-15 | Stop reason: SURG

## 2021-03-15 RX ORDER — ALBUTEROL SULFATE 2.5 MG/3ML
2.5 SOLUTION RESPIRATORY (INHALATION) ONCE AS NEEDED
Status: DISCONTINUED | OUTPATIENT
Start: 2021-03-15 | End: 2021-03-15 | Stop reason: HOSPADM

## 2021-03-15 RX ORDER — HYDROMORPHONE HCL 110MG/55ML
PATIENT CONTROLLED ANALGESIA SYRINGE INTRAVENOUS AS NEEDED
Status: DISCONTINUED | OUTPATIENT
Start: 2021-03-15 | End: 2021-03-15 | Stop reason: SURG

## 2021-03-15 RX ORDER — ONDANSETRON 2 MG/ML
4 INJECTION INTRAMUSCULAR; INTRAVENOUS ONCE AS NEEDED
Status: DISCONTINUED | OUTPATIENT
Start: 2021-03-15 | End: 2021-03-15 | Stop reason: HOSPADM

## 2021-03-15 RX ORDER — LIDOCAINE HYDROCHLORIDE 20 MG/ML
INJECTION, SOLUTION INFILTRATION; PERINEURAL AS NEEDED
Status: DISCONTINUED | OUTPATIENT
Start: 2021-03-15 | End: 2021-03-15 | Stop reason: SURG

## 2021-03-15 RX ORDER — OXYCODONE HYDROCHLORIDE AND ACETAMINOPHEN 5; 325 MG/1; MG/1
1 TABLET ORAL ONCE AS NEEDED
Status: DISCONTINUED | OUTPATIENT
Start: 2021-03-15 | End: 2021-03-15 | Stop reason: HOSPADM

## 2021-03-15 RX ORDER — DEXAMETHASONE SODIUM PHOSPHATE 4 MG/ML
INJECTION, SOLUTION INTRA-ARTICULAR; INTRALESIONAL; INTRAMUSCULAR; INTRAVENOUS; SOFT TISSUE AS NEEDED
Status: DISCONTINUED | OUTPATIENT
Start: 2021-03-15 | End: 2021-03-15 | Stop reason: SURG

## 2021-03-15 RX ORDER — METOPROLOL TARTRATE 5 MG/5ML
5 INJECTION INTRAVENOUS
Status: DISCONTINUED | OUTPATIENT
Start: 2021-03-15 | End: 2021-03-15 | Stop reason: HOSPADM

## 2021-03-15 RX ORDER — OXYCODONE HYDROCHLORIDE AND ACETAMINOPHEN 5; 325 MG/1; MG/1
1 TABLET ORAL EVERY 4 HOURS PRN
Qty: 40 TABLET | Refills: 0 | Status: SHIPPED | OUTPATIENT
Start: 2021-03-15 | End: 2021-03-29

## 2021-03-15 RX ORDER — ONDANSETRON 4 MG/1
4 TABLET, ORALLY DISINTEGRATING ORAL EVERY 8 HOURS PRN
Qty: 10 TABLET | Refills: 1 | Status: SHIPPED | OUTPATIENT
Start: 2021-03-15 | End: 2021-03-22

## 2021-03-15 RX ORDER — OXYMETAZOLINE HYDROCHLORIDE 0.05 G/100ML
SPRAY NASAL AS NEEDED
Status: DISCONTINUED | OUTPATIENT
Start: 2021-03-15 | End: 2021-03-15 | Stop reason: HOSPADM

## 2021-03-15 RX ORDER — CEFUROXIME AXETIL 500 MG/1
500 TABLET ORAL 2 TIMES DAILY
Qty: 14 TABLET | Refills: 0 | Status: SHIPPED | OUTPATIENT
Start: 2021-03-15 | End: 2021-03-22

## 2021-03-15 RX ADMIN — ONDANSETRON 4 MG: 2 INJECTION INTRAMUSCULAR; INTRAVENOUS at 11:49

## 2021-03-15 RX ADMIN — OXYMETAZOLINE HYDROCHLORIDE 2 SPRAY: 0.05 SPRAY NASAL at 07:06

## 2021-03-15 RX ADMIN — HYDROMORPHONE HYDROCHLORIDE 0.5 MG: 1 INJECTION, SOLUTION INTRAMUSCULAR; INTRAVENOUS; SUBCUTANEOUS at 15:20

## 2021-03-15 RX ADMIN — FENTANYL CITRATE 50 MCG: 50 INJECTION INTRAMUSCULAR; INTRAVENOUS at 09:59

## 2021-03-15 RX ADMIN — DEXAMETHASONE SODIUM PHOSPHATE 4 MG: 4 INJECTION, SOLUTION INTRAMUSCULAR; INTRAVENOUS at 10:59

## 2021-03-15 RX ADMIN — LIDOCAINE HYDROCHLORIDE 100 MG: 20 INJECTION, SOLUTION INFILTRATION; PERINEURAL at 09:59

## 2021-03-15 RX ADMIN — SODIUM CHLORIDE, SODIUM GLUCONATE, SODIUM ACETATE, POTASSIUM CHLORIDE, AND MAGNESIUM CHLORIDE 1000 ML: 526; 502; 368; 37; 30 INJECTION, SOLUTION INTRAVENOUS at 07:06

## 2021-03-15 RX ADMIN — FENTANYL CITRATE 50 MCG: 50 INJECTION INTRAMUSCULAR; INTRAVENOUS at 10:07

## 2021-03-15 RX ADMIN — PROPOFOL 200 MG: 10 INJECTION, EMULSION INTRAVENOUS at 09:59

## 2021-03-15 RX ADMIN — ROCURONIUM BROMIDE 25 MG: 50 INJECTION INTRAVENOUS at 10:04

## 2021-03-15 RX ADMIN — Medication 3 MG: at 11:56

## 2021-03-15 RX ADMIN — MIDAZOLAM HYDROCHLORIDE 2 MG: 2 INJECTION, SOLUTION INTRAMUSCULAR; INTRAVENOUS at 09:50

## 2021-03-15 RX ADMIN — HYDROMORPHONE HYDROCHLORIDE 1 MG: 2 INJECTION, SOLUTION INTRAMUSCULAR; INTRAVENOUS; SUBCUTANEOUS at 10:36

## 2021-03-15 RX ADMIN — GLYCOPYRROLATE 0.4 MCG: 0.2 INJECTION, SOLUTION INTRAMUSCULAR; INTRAVITREAL at 11:56

## 2021-03-15 RX ADMIN — SODIUM CHLORIDE, SODIUM GLUCONATE, SODIUM ACETATE, POTASSIUM CHLORIDE, AND MAGNESIUM CHLORIDE: 526; 502; 368; 37; 30 INJECTION, SOLUTION INTRAVENOUS at 11:44

## 2021-03-15 RX ADMIN — MIDAZOLAM HYDROCHLORIDE 2 MG: 2 INJECTION, SOLUTION INTRAMUSCULAR; INTRAVENOUS at 12:12

## 2021-03-15 RX ADMIN — METOPROLOL TARTRATE 5 MG: 5 INJECTION INTRAVENOUS at 12:47

## 2021-03-15 RX ADMIN — ROCURONIUM BROMIDE 25 MG: 50 INJECTION INTRAVENOUS at 09:59

## 2021-03-15 RX ADMIN — OXYCODONE HYDROCHLORIDE AND ACETAMINOPHEN 1 TABLET: 5; 325 TABLET ORAL at 13:43

## 2021-03-15 NOTE — INTERVAL H&P NOTE
H&P reviewed. The patient was examined and there are no changes to the H&P.      Temp:  [97.7 °F (36.5 °C)] 97.7 °F (36.5 °C)  Heart Rate:  [84] 84  Resp:  [18] 18  BP: (173)/(107) 173/107

## 2021-03-15 NOTE — BRIEF OP NOTE
SINOSCOPY PROCEDURE WITH BRAINLAB  Progress Note    Colby Don  3/15/2021    Pre-op Diagnosis:   Chronic pansinusitis [J32.4]  Nasal septal deformity [J34.2]       Post-Op Diagnosis Codes:     * Chronic pansinusitis [J32.4]     * Nasal septal deformity [J34.2]    Procedure/CPT® Codes:        Procedure(s):  Bilateral endoscopic complete ethmoidectomy, maxillary sinus antrostomy, frontal recess exploration, sphenoidotomy; septoplasty    Surgeon(s):  Anibal Enriquez MD    Anesthesia: General    Staff:   Circulator: Jennifer Calero RN; Lit Jo RN  Scrub Person: Kassie Lui; Day Bhatia  Assistant: Andra Young  Assistant: Andra Young      Estimated Blood Loss: 200ml    Urine Voided: * No values recorded between 3/15/2021  9:51 AM and 3/15/2021 12:00 PM *    Specimens:                Specimens     ID Source Type Tests Collected By Collected At Frozen?    A Sinus, ethmoid left Tissue · TISSUE PATHOLOGY EXAM   Anibal Enriquez MD 3/15/21 1157     Description: left ethmoid    This specimen was not marked as sent.    B Sinus, ethmoid right Tissue · TISSUE PATHOLOGY EXAM   Anibal Enriquez MD 3/15/21 0558     Description: Right ethmoid with septal fragments    This specimen was not marked as sent.                Drains: * No LDAs found *    Findings: Markedly angulated septal deformity to the right.  Chronically inflamed diseased appearing mucosa throughout the paranasal sinuses most pronounced in the left posterior ethmoid and sphenoid    Complications: None        Anibal Enriquez MD     Date: 3/15/2021  Time: 12:00 CDT

## 2021-03-15 NOTE — ANESTHESIA PREPROCEDURE EVALUATION
Anesthesia Evaluation     no history of anesthetic complications:  NPO Solid Status: > 8 hours  NPO Liquid Status: > 8 hours           Airway   Mallampati: III  TM distance: >3 FB  Neck ROM: full  Possible difficult intubation  Dental - normal exam     Pulmonary - normal exam    breath sounds clear to auscultation  (-) COPD, asthma, sleep apnea, not a smoker    ROS comment: Pansinusitis  snores  Cardiovascular - normal exam  Exercise tolerance: good (4-7 METS)    ECG reviewed  Rhythm: regular  Rate: normal    (+) hypertension poorly controlled 2 medications or greater, hyperlipidemia,   (-) valvular problems/murmurs, dysrhythmias, angina, cardiac stents, DVT    ROS comment: Normal sinus rhythm  Normal ECG  When compared with ECG of 27-MAR-2020 02:41,  No significant change was found    Neuro/Psych  (-) seizures, TIA, CVA, headaches, weakness, numbness, psychiatric history (off meds)  GI/Hepatic/Renal/Endo    (+) obesity,     (-) GERD, hepatitis, liver disease, no renal disease, diabetes, no thyroid disorder    Musculoskeletal (-) negative ROS    Abdominal    Substance History   (+) alcohol use (socially),   (-) drug use     OB/GYN          Other        (-) history of cancer                  Anesthesia Plan    ASA 3     general   (Cohen at induction)  intravenous induction     Anesthetic plan, all risks, benefits, and alternatives have been provided, discussed and informed consent has been obtained with: patient and mother.

## 2021-03-15 NOTE — OP NOTE
PREOPERATIVE DIAGNOSES:   1. Chronic pansinusitis.   2. Nasal septal deformity.     POSTOPERATIVE DIAGNOSES:   1. Chronic pansinusitis.   2. Nasal septal deformity.     PROCEDURES PERFORMED:   1. Bilateral endoscopic complete ethmoidectomy.   2. Maxillary sinus antrostomy.  3. Frontal recess exploration.  4. Sphenoidotomy.  5. Septoplasties.      SURGEON: Anibal Enriquez MD    ANESTHESIA: General endotracheal.    ESTIMATED BLOOD LOSS: 200 mL.     FLUIDS: Crystalloids.     SPECIMENS:   1. Right ethmoid with septal fragments.   2. Left ethmoid.     COMPLICATIONS: None.     INDICATIONS FOR PROCEDURE: 40-year-old male with chronic pansinusitis that failed to respond to medical therapy, as well as a markedly angulated septal deformity.     FINDINGS: Include markedly angulated septal deformity to the right and chronically appearing inflamed diseased appearing mucosa throughout the paranasal sinuses, but most pronounced in the left posterior ethmoid and sphenoid.     DESCRIPTION OF PROCEDURE: The patient was taken to the operating room and placed in the supine position. After the satisfactory induction of general endotracheal anesthesia, the head of the bed was turned and the [employer] electromagnetic guidance system was utilized and the patient was registered, and accuracy was confirmed. Afrin-soaked pledgets were then placed in the nares bilaterally and the face was prepped and draped in the usual fashion. The microdebrider and angled ball tip probe were fitted for tracking, registered, and accuracy was confirmed. The Afrin-soaked pledgets were removed from the nares. Zero degree scope was passed into the left naris. Middle turbinate was subluxed medially. The uncinate process was infiltrated with 1% xylocaine with epinephrine. This was incised from superior to inferior along its anterior attachment using a sickle knife. Base of the uncinate was amputated using scissors and the uncinate was resected with straight biting  forceps. The ethmoid bullae and additional anterior ethmoid cells were resected using the microdebrider back to the horizontal attachment of the middle turbinate, which was then pierced bluntly and the posterior ethmoid cells were resected using the microdebrider back to the face of the sphenoid. Sphenoidotomy was made bluntly. There was noted to be inflamed, diseased appearing mucosa in the left sphenoid, but no purulence or secretions that could be evacuated. Not wanting to strip the mucosa and demucosalize the bone of the sphenoid, a small amount of tissue was resected to the point of ensuring that the sphenoidotomy was patent, but no additional resection was done within the sphenoid. A maxillary sinus antrostomy was created by cannulating the natural sinus ostium with the ball tip probe and enlarging this posteriorly and inferiorly bluntly, and then further enlarging with the microdebrider and backbiting forceps. Frontal recess as opened using a 30 degree scope for visualization. The patient's CT scan had previously identified a protrusion through the lateral lamella of the cribriform plate and so care was taken to stay lateral along the orbit and obstructing ethmoid partitions were removed using the angled ball tip probe and pediatric upbiting forceps. Once this was completed, a curve suction was able to be passed into the frontal sinus. Afrin-soaked pledgets were then placed in the middle meatal cleft and attention was turned to the septoplasty portion of the procedure. The patient's head was placed in the neutral position and using a headlight for visualization, 2% xylocaine with epinephrine was infiltrated into the mucosa at the septum bilaterally. A [place] incision was made on the left and a mucoperichondrial flap was elevated back beyond the bony cartilaginous junction using a [place] elevator. An incision was made in the septal cartilage just posterior to the mucosal incision and a contralateral  mucoperichondrial flap was elevated back beyond the bony cartilaginous junction. The cartilaginous incision was extended interiorly down to the nasal spine. The septal cartilage was elevated off the nasal spine from anterior to posterior back to the bony cartilaginous junction, which was then  from inferior to superior using a Alice elevator. Cartilaginous incision was then brought anteriorly to join the previous incision allowing removal of a deflected piece of quadrangular cartilage while leaving a continuous dorsal and columellar strut in place. Mucosal flaps were then elevated off the nasal spine and the bony septum. Marked angulations to the right were noted and these were resected using Alfred forceps. Once this was completed, the head was flexed and the endoscopic portion of the procedure was resumed on the right side. The middle turbinate was subluxed medially using a Alice elevator. Uncinate was infiltrated with 1% xylocaine with epinephrine. The uncinate was then incised from superior to inferior along its anterior attachments using a sickle knife. The base of the uncinate was amputated with scissors and the uncinate was resected with straight biting forceps. The ethmoid bullae and additional anterior ethmoid cells were resected using the microdebrider back to the horizontal attachment of the middle turbinate, which was pierced and the posterior ethmoid cells were also resected using the microdebrider back to the face of the sphenoid. Sphenoidotomy was created bluntly and enlarged medially and inferiorly using the microdebrider. A maxillary sinus antrostomy was created by cannulating the natural sinus ostium with a ball tipped probe. This was enlarged posteriorly and inferiorly, first bluntly, and then further enlarged using the microdebrider and backbiting forceps. The frontal recess was explored using a 30 degree scope. Obstructing ethmoid partitions were removed from the roof of the ethmoid  from posterior to anterior using angled ball tip probe and pediatric upbiting forceps. A curve suction was able to be passed into the frontal sinus when this was completed. Afrin-soaked pledgets were then placed in the middle meatal cleft. Head was again placed in the neutral position and attention was turned back to the septoplasty. Previously resected cartilage was trimmed, morselized, and placed between the mucosal flaps of the septum. The [place] incision was closed with interrupted 5.0 Vicryl. The mucosa was inspected with a zero degree scope to ensure that there were no through and through mucosal defects. The mucosal flaps were then reapproximated to one another using a running through and through mattress suture of 4.0 plain gut.  This included transfixing the reimplanted cartilage. The nares were widely patent bilaterally, as evidenced by being able to pass a large silver speculum through each naris without difficulty. All Afrin-soaked pledgets were removed. Mupirocin-coated Telfa pads were placed in the nares bilaterally and the procedure was terminated. The patient tolerated the procedure well and went to the recovery room in satisfactory condition.

## 2021-03-15 NOTE — ANESTHESIA PROCEDURE NOTES
Airway  Urgency: elective    Date/Time: 3/15/2021 10:02 AM  Airway not difficult    General Information and Staff    Patient location during procedure: OR    Indications and Patient Condition  Indications for airway management: airway protection    Preoxygenated: yes  MILS maintained throughout  Mask difficulty assessment: 1 - vent by mask    Final Airway Details  Final airway type: endotracheal airway      Successful airway: ETT  Cuffed: yes   Successful intubation technique: video laryngoscopy (for RT student learning opportunity)  Facilitating devices/methods: intubating stylet  Endotracheal tube insertion site: oral  Blade: [place]  Blade size: 4  ETT size (mm): 7.5  Cormack-Lehane Classification: grade I - full view of glottis  Placement verified by: chest auscultation and capnometry   Measured from: lips  ETT/EBT  to lips (cm): 22  Number of attempts at approach: 1  Assessment: lips, teeth, and gum same as pre-op and atraumatic intubation

## 2021-03-15 NOTE — ANESTHESIA POSTPROCEDURE EVALUATION
Patient: Colby Don    Procedure Summary     Date: 03/15/21 Room / Location: [facility] OR 08 / [facility] OR    Anesthesia Start: 0951 Anesthesia Stop: 1226    Procedure: Bilateral endoscopic complete ethmoidectomy, maxillary sinus antrostomy, frontal recess exploration, sphenoidotomy; septoplasty (Bilateral ) Diagnosis:       Chronic pansinusitis      Nasal septal deformity      (Chronic pansinusitis [J32.4])      (Nasal septal deformity [J34.2])    Surgeons: Anibal Enriquez MD Provider: Ed Leon MD    Anesthesia Type: general ASA Status: 3          Anesthesia Type: general    Vitals  No vitals data found for the desired time range.          Post Anesthesia Care and Evaluation    Patient location during evaluation: PACU  Patient participation: complete - patient participated  Level of consciousness: awake  Pain score: 0  Pain management: adequate  Airway patency: patent  Anesthetic complications: No anesthetic complications  PONV Status: none  Cardiovascular status: acceptable  Respiratory status: acceptable  Hydration status: acceptable

## 2021-03-16 ENCOUNTER — OFFICE VISIT (OUTPATIENT)
Dept: OTOLARYNGOLOGY | Facility: CLINIC | Age: 41
End: 2021-03-16

## 2021-03-16 VITALS — OXYGEN SATURATION: 97 % | TEMPERATURE: 98.1 F | BODY MASS INDEX: 32.11 KG/M2 | WEIGHT: 216.8 LBS | HEIGHT: 69 IN

## 2021-03-16 DIAGNOSIS — Z48.813 AFTERCARE FOLLOWING SURGERY OF THE RESPIRATORY SYSTEM: Primary | ICD-10-CM

## 2021-03-16 PROCEDURE — 99024 POSTOP FOLLOW-UP VISIT: CPT | Performed by: OTOLARYNGOLOGY

## 2021-03-16 NOTE — PROGRESS NOTES
Subjective   Colby Don is a 40 y.o. male.       History of Present Illness   Patient is 1 day status post endoscopic sinus surgery and septoplasty.  Is here today to get his packing out.  Has had a good bit of bloody drainage and postnasal drip through the night and had pain and elevated blood pressure last night but nothing bad enough to warrant additional action.      The following portions of the patient's history were reviewed and updated as appropriate: allergies, current medications, past family history, past medical history, past social history, past surgical history and problem list.     reports that he has quit smoking. He has never used smokeless tobacco. He reports current alcohol use of about 3.0 - 4.0 standard drinks of alcohol per week. He reports that he does not use drugs.   Patient is not a tobacco user and has not been counseled for use of tobacco products      Review of Systems        Objective   Physical Exam  Packing in place, no active bleeding.  Packing removed.  Septum is intact.  No active bleeding.      Assessment/Plan   Diagnoses and all orders for this visit:    1. Aftercare following surgery of the respiratory system (Primary)      Plan: Packing removed as described above.  Begin saline nasal spray 2 sprays each nostril every 2-3 hours while awake.  No nose blowing.  Return Thursday for first debridement, call sooner for problems.

## 2021-03-17 LAB
LAB AP CASE REPORT: NORMAL
PATH REPORT.FINAL DX SPEC: NORMAL

## 2021-03-18 ENCOUNTER — OFFICE VISIT (OUTPATIENT)
Dept: OTOLARYNGOLOGY | Facility: CLINIC | Age: 41
End: 2021-03-18

## 2021-03-18 VITALS — BODY MASS INDEX: 31.99 KG/M2 | HEIGHT: 69 IN | OXYGEN SATURATION: 98 % | WEIGHT: 216 LBS

## 2021-03-18 DIAGNOSIS — Z48.813 AFTERCARE FOLLOWING SURGERY OF THE RESPIRATORY SYSTEM: Primary | ICD-10-CM

## 2021-03-18 PROCEDURE — 99024 POSTOP FOLLOW-UP VISIT: CPT | Performed by: OTOLARYNGOLOGY

## 2021-03-18 NOTE — PROGRESS NOTES
Subjective   Colby Don is a 40 y.o. male.       History of Present Illness     Patient is here for first debridement status last night he could breathe better through his nose than he has in a long time.  He has been using his saline spray as directed.    The following portions of the patient's history were reviewed and updated as appropriate: allergies, current medications, past family history, past medical history, past social history, past surgical history and problem list.     reports that he has quit smoking. He has never used smokeless tobacco. He reports current alcohol use of about 3.0 - 4.0 standard drinks of alcohol per week. He reports that he does not use drugs.   Patient is not a tobacco user and has not been counseled for use of tobacco products      Review of Systems        Objective   Physical Exam  Fish-Synephrine and Xylocaine are instilled the nares bilaterally.  Clots and crusts are debrided from the middle meatal clefts and maxillary sinus antrostomies using suction.  Septal incision is intact.      Assessment/Plan   Diagnoses and all orders for this visit:    1. Aftercare following surgery of the respiratory system (Primary)      Plan: Debridement as described above.  Continue saline spray frequently.  Return Monday for second debridement.

## 2021-03-22 ENCOUNTER — OFFICE VISIT (OUTPATIENT)
Dept: OTOLARYNGOLOGY | Facility: CLINIC | Age: 41
End: 2021-03-22

## 2021-03-22 VITALS — OXYGEN SATURATION: 98 % | BODY MASS INDEX: 31.64 KG/M2 | TEMPERATURE: 97.7 F | WEIGHT: 213.6 LBS | HEIGHT: 69 IN

## 2021-03-22 DIAGNOSIS — Z48.813 AFTERCARE FOLLOWING SURGERY OF THE RESPIRATORY SYSTEM: Primary | ICD-10-CM

## 2021-03-22 PROCEDURE — 99024 POSTOP FOLLOW-UP VISIT: CPT | Performed by: OTOLARYNGOLOGY

## 2021-03-22 NOTE — PROGRESS NOTES
Subjective   Colby Don is a 40 y.o. male.       History of Present Illness     Patient is now 1 week status post endoscopic sinus surgery and septoplasty.  Is here for his second postop debridement.  States he is doing well.  Says he can tell he can breathe better through his nose.    The following portions of the patient's history were reviewed and updated as appropriate: allergies, current medications, past family history, past medical history, past social history, past surgical history and problem list.     reports that he has quit smoking. He has never used smokeless tobacco. He reports current alcohol use of about 3.0 - 4.0 standard drinks of alcohol per week. He reports that he does not use drugs.   Patient is not a tobacco user and has not been counseled for use of tobacco products      Review of Systems        Objective   Physical Exam  Fish-Synephrine and Xylocaine are instilled the nares bilaterally.  0 degree scope was passed into each nostril.  Clots and crusts are debrided from the middle meatal cleft bilaterally.  The maxillary sinus antrostomies are widely patent.  Septal incision is intact.      Assessment/Plan   Diagnoses and all orders for this visit:    1. Aftercare following surgery of the respiratory system (Primary)        Plan: Debridement as described above.  Continue saline spray frequently.  Return in 1 week for what will hopefully be his final debridement.

## 2021-03-29 ENCOUNTER — OFFICE VISIT (OUTPATIENT)
Dept: OTOLARYNGOLOGY | Facility: CLINIC | Age: 41
End: 2021-03-29

## 2021-03-29 VITALS — OXYGEN SATURATION: 98 % | BODY MASS INDEX: 32.26 KG/M2 | TEMPERATURE: 97.5 F | HEIGHT: 69 IN | WEIGHT: 217.8 LBS

## 2021-03-29 DIAGNOSIS — Z48.813 AFTERCARE FOLLOWING SURGERY OF THE RESPIRATORY SYSTEM: Primary | ICD-10-CM

## 2021-03-29 PROCEDURE — 99024 POSTOP FOLLOW-UP VISIT: CPT | Performed by: OTOLARYNGOLOGY

## 2021-03-29 NOTE — PROGRESS NOTES
Subjective   Colby Don is a 40 y.o. male.       History of Present Illness     Patient is status post endoscopic sinus surgery and septoplasty 2 weeks ago.  Is doing well.  Feels much better.  Is very pleased with his breathing and is sleeping much better    The following portions of the patient's history were reviewed and updated as appropriate: allergies, current medications, past family history, past medical history, past social history, past surgical history and problem list.     reports that he has quit smoking. He has never used smokeless tobacco. He reports current alcohol use of about 3.0 - 4.0 standard drinks of alcohol per week. He reports that he does not use drugs.   Patient is not a tobacco user and has not been counseled for use of tobacco products      Review of Systems        Objective   Physical Exam  Nasal endoscopy is performed: Fish-Synephrine and Xylocaine are instilled the nares bilaterally.  0° scope is passed into each nostril.  The inferior, middle, and superior turbinates as well as nasal septum and nasopharynx are examined.  Pertinent findings include: Modest amount of crusting is noted in both middle meatal clefts and is debrided with suction.  Septal incision is intact.  Sutures are trimmed and removed.      Assessment/Plan   Diagnoses and all orders for this visit:    1. Aftercare following surgery of the respiratory system (Primary)      Plan: May resume unrestricted activities.  Return in 1 month for what will hopefully be a final recheck.  Call sooner for problems.

## 2021-04-28 ENCOUNTER — OFFICE VISIT (OUTPATIENT)
Dept: OTOLARYNGOLOGY | Facility: CLINIC | Age: 41
End: 2021-04-28

## 2021-04-28 VITALS — OXYGEN SATURATION: 98 % | WEIGHT: 216.6 LBS | TEMPERATURE: 98.2 F | HEIGHT: 69 IN | BODY MASS INDEX: 32.08 KG/M2

## 2021-04-28 DIAGNOSIS — Z48.813 AFTERCARE FOLLOWING SURGERY OF THE RESPIRATORY SYSTEM: Primary | ICD-10-CM

## 2021-04-28 PROCEDURE — 31231 NASAL ENDOSCOPY DX: CPT | Performed by: OTOLARYNGOLOGY

## 2021-04-28 NOTE — PROGRESS NOTES
Subjective   Colby Don is a 40 y.o. male.       History of Present Illness   Patient is now approximately 6 weeks status post endoscopic sinus surgery and septoplasty.  Reports he is breathing well through his nose and is very pleased with his results.      The following portions of the patient's history were reviewed and updated as appropriate: allergies, current medications, past family history, past medical history, past social history, past surgical history and problem list.     reports that he has quit smoking. He has never used smokeless tobacco. He reports current alcohol use of about 3.0 - 4.0 standard drinks of alcohol per week. He reports that he does not use drugs.   Patient is not a tobacco user and has not been counseled for use of tobacco products      Review of Systems        Objective   Physical Exam  Nasal endoscopy is performed: Fish-Synephrine and Xylocaine are instilled the nares bilaterally.  0° scope is passed into each nostril.  The inferior, middle, and superior turbinates as well as nasal septum and nasopharynx are examined.  Pertinent findings include: Patent maxillary sinus antrostomies bilaterally with no evidence of mass or purulence or polyp.  Septum is intact.      Assessment/Plan   Diagnoses and all orders for this visit:    1. Aftercare following surgery of the respiratory system (Primary)        Plan: No further treatment needed from my standpoint.  May follow-up with me as needed.

## (undated) DEVICE — STERILE POLYISOPRENE POWDER-FREE SURGICAL GLOVES WITH EMOLLIENT COATING: Brand: PROTEXIS

## (undated) DEVICE — CONTAINER,SPECIMEN,OR STERILE,4OZ: Brand: MEDLINE

## (undated) DEVICE — TOWEL,OR,DSP,ST,BLUE,DLX,8/PK,10PK/CS: Brand: MEDLINE

## (undated) DEVICE — TP SXN YANKR BLB TIP W/TBG 10F LF STRL

## (undated) DEVICE — SUT SILK 2/0 FS BLK 18IN 685G

## (undated) DEVICE — DRSNG TELFA PAD NONADH STR 1S 3X8IN

## (undated) DEVICE — KT ANTI FOG W/FLD AND SPNG

## (undated) DEVICE — BLD E/S SINUS SHVE DIEGO ELITE STD STR TYPE A PK/5

## (undated) DEVICE — GLV SURG SENSICARE PI ORTHO SZ6.5 LF STRL

## (undated) DEVICE — PK ENT LF 60

## (undated) DEVICE — SUT PLAIN 1 4/0 1828H

## (undated) DEVICE — GOWN,NON-REINFORCED,SIRUS,SET IN SLV,XL: Brand: MEDLINE

## (undated) DEVICE — NDL HYPO ECLPS SFTY 25G 1 1/2IN

## (undated) DEVICE — SUT VIC 5/0 P3 18IN UD VCP493G

## (undated) DEVICE — TBG DECLOG DIEGO ELITE MULTIDEBRIDER ST

## (undated) DEVICE — CODMAN® SURGICAL PATTIES 1/2" X 3" (1.27CM X 7.62CM): Brand: CODMAN®

## (undated) DEVICE — 3M™ STERI-STRIP™ REINFORCED ADHESIVE SKIN CLOSURES, R1547, 1/2 IN X 4 IN (12 MM X 100 MM), 6 STRIPS/ENVELOPE: Brand: 3M™ STERI-STRIP™

## (undated) DEVICE — BRACKT EM SENSR FOR DIEGO ELITE TB ST

## (undated) DEVICE — COVER,MAYO STAND,STERILE: Brand: MEDLINE

## (undated) DEVICE — DEFOGGER!" ANTI FOG KIT: Brand: DEROYAL